# Patient Record
Sex: MALE | Race: WHITE | URBAN - METROPOLITAN AREA
[De-identification: names, ages, dates, MRNs, and addresses within clinical notes are randomized per-mention and may not be internally consistent; named-entity substitution may affect disease eponyms.]

---

## 2017-03-06 ENCOUNTER — OFFICE VISIT (OUTPATIENT)
Dept: FAMILY MEDICINE | Facility: CLINIC | Age: 37
End: 2017-03-06
Payer: COMMERCIAL

## 2017-03-06 VITALS
OXYGEN SATURATION: 98 % | SYSTOLIC BLOOD PRESSURE: 124 MMHG | TEMPERATURE: 97.1 F | HEART RATE: 64 BPM | DIASTOLIC BLOOD PRESSURE: 82 MMHG | RESPIRATION RATE: 15 BRPM | WEIGHT: 167 LBS

## 2017-03-06 DIAGNOSIS — J06.9 VIRAL URI WITH COUGH: ICD-10-CM

## 2017-03-06 DIAGNOSIS — B36.0 TINEA VERSICOLOR: ICD-10-CM

## 2017-03-06 DIAGNOSIS — L01.00 IMPETIGO: Primary | ICD-10-CM

## 2017-03-06 PROBLEM — F17.200 TOBACCO USE DISORDER: Status: ACTIVE | Noted: 2017-03-06

## 2017-03-06 PROCEDURE — 99203 OFFICE O/P NEW LOW 30 MIN: CPT | Performed by: PHYSICIAN ASSISTANT

## 2017-03-06 RX ORDER — FLUCONAZOLE 100 MG/1
100 TABLET ORAL DAILY
Qty: 14 TABLET | Refills: 3 | Status: SHIPPED | OUTPATIENT
Start: 2017-03-06 | End: 2017-04-11

## 2017-03-06 RX ORDER — MUPIROCIN 20 MG/G
OINTMENT TOPICAL 3 TIMES DAILY
Qty: 22 G | Refills: 1 | Status: SHIPPED | OUTPATIENT
Start: 2017-03-06 | End: 2017-03-11

## 2017-03-06 NOTE — PATIENT INSTRUCTIONS
Selsun Blue - apply to affected area and allow to dry on skin for 10-15 minutes before rinsing/showering.  Use daily for two weeks.  Understanding Impetigo (Adult)  Impetigo is a skin infection that causes red, crusty sores. It s more common in young children, but adults can also get it. It can be spread easily from person to person.  What causes impetigo?  Impetigo is caused by bacteria. It can be caused by group A streptococci (strep). Or it can be caused by Staphylococcus aureus (staph). The bacteria can cause the infection after an insect bite, cut, or other skin problem causes a break in your skin.  Symptoms of impetigo  Impetigo causes itchy red sores that ooze fluid (pus) and form honey-colored crusts. They are most common on the face, arms, and legs. The sores can get bigger and spread to other parts of your body.  Diagnosing and treating impetigo  Your health care provider will give you a physical exam. He or she will be able to diagnose impetigo by looking at your sores. A bacterial culture may be performed. Impetigo can be treated with antibiotics. You may be given oral medication. Use the antibiotic medication exactly as directed. Do not stop using it if your symptoms get better. Use all of the medication until it is gone.  Your symptoms will likely get better within 3 days. The sores will take several weeks to heal fully.  If you have impetigo  Impetigo is easily spread from person to person. A person can get sores 1 to 3 days after being exposed to another person s sores. Make sure to protect those around you. To prevent the bacteria from spreading to others:    Cover your sores with a bandage. Wash your hands often. This will also help you avoid spreading the infection to other parts of your body.    Don t share washcloths, towels, pillows, sheets, or clothes with others. Wash these items in hot water before using them again.    Don t scratch or pick at your sores.    Wash your sores with soap and  water. Apply an antibacterial cream as advised.     When to call the health care provider  Make sure to contact your health care provider if you have:    Sores that spread or have more pus after 3 days of treatment    Pain or swelling that s new or worse    Fever of 100.4 F (38 C) or higher    Loss of appetite or vomiting     3561-9247 The [employer] [address] [place] [address]. All rights reserved. This information is not intended as a substitute for professional medical care. Always follow your healthcare professional's instructions.        Tinea Versicolor  This is a rash caused by a fungus in the top layers of the skin. This fungus is normally present in the pores of the skin and causes no symptoms, but when the fungus overgrows it causes a rash. The fungus grows more easily in hot climates and on oily or sweaty skin. It is unknown why some people get this rash and others do not, or why the rash would suddenly appear in someone who has never had it before.  The rash consists of irregular, pale, or tan spots and patches. The rash is usually on the neck, upper back, chest, and shoulders. There may be mild itching, especially if you become overheated, but it doesn't cause other symptoms. Because these spots don't change color with sun exposure like normal skin, the rash may appear as lighter or darker than normal skin.  Since the rash is harmless and usually causes no symptoms, the only reason for treatment is to improve appearance. Follow the advice below to clear the rash. However, it will take several months for normal skin color to return.  Home care  The following guidelines will help you care for yourself at home:    Apply medicated dandruff shampoo (no prescription required) to the rash at night. Then, rinse off in the morning using a bath brush or buffing sponge to scrub the skin and remove top layers of dead skin which contain the fungus. Do this every day for 4 weeks.    As an  alternate treatment, you may buy an antifungal cream (miconazole or clotrimazole, which are available without a prescription) and apply this twice a day for 7 days. You should still use a bath brush or buffing sponge to scrub the skin once a day in the shower.    This rash is not contagious to others. So there is no need to avoid exposure of others at school, , or work.  Prevention  This fungus can recur after treatment. To prevent return of the rash, once a month for the next year, apply medicated dandruff shampoo to the areas where the rash once appeared. Rinse off in the morning and scrub the skin as described above. This is especially important to do in the summer time when the rash is most likely to recur.  Other prevention tips include:    Avoid oily skin products.    Wear loose clothing.    Use sunscreen and protect yourself from sunlight.    Avoid tanning beds.  Follow-up care  Follow up with your doctor or as advised by our staff if the rash fails to improve with the above treatment, or if new symptoms appear.  When to seek medical care  Get prompt medical attention if any of the following occur:    Increasing redness of the rash    Change in appearance of the rash    Fever of 100.4 F (38 C) or higher, or as directed by your health care provider    9876-5176 [employer]. [address] [place] [address], [place] [address]. All rights reserved. This information is not intended as a substitute for professional medical care. Always follow your healthcare professional's instructions.

## 2017-03-06 NOTE — NURSING NOTE
Chief Complaint   Patient presents with     Derm Problem       Initial /82 (BP Location: Left arm, Patient Position: Chair, Cuff Size: Adult Regular)  Pulse 64  Temp 97.1  F (36.2  C) (Tympanic)  Resp 15  Wt 167 lb (75.8 kg)  SpO2 98% There is no height or weight on file to calculate BMI.  Medication Reconciliation: complete

## 2017-03-06 NOTE — MR AVS SNAPSHOT
After Visit Summary   3/6/2017    Vega Valentino    MRN: [id number]           Patient Information     Date Of Birth          1980        Visit Information        Provider Department      3/6/2017 9:50 AM Marisabel Pacheco PA-C [facility]        Today's Diagnoses     Impetigo    -  1    Tinea versicolor          Care Instructions    Selsun Blue - apply to affected area and allow to dry on skin for 10-15 minutes before rinsing/showering.  Use daily for two weeks.  Understanding Impetigo (Adult)  Impetigo is a skin infection that causes red, crusty sores. It s more common in young children, but adults can also get it. It can be spread easily from person to person.  What causes impetigo?  Impetigo is caused by bacteria. It can be caused by group A streptococci (strep). Or it can be caused by Staphylococcus aureus (staph). The bacteria can cause the infection after an insect bite, cut, or other skin problem causes a break in your skin.  Symptoms of impetigo  Impetigo causes itchy red sores that ooze fluid (pus) and form honey-colored crusts. They are most common on the face, arms, and legs. The sores can get bigger and spread to other parts of your body.  Diagnosing and treating impetigo  Your health care provider will give you a physical exam. He or she will be able to diagnose impetigo by looking at your sores. A bacterial culture may be performed. Impetigo can be treated with antibiotics. You may be given oral medication. Use the antibiotic medication exactly as directed. Do not stop using it if your symptoms get better. Use all of the medication until it is gone.  Your symptoms will likely get better within 3 days. The sores will take several weeks to heal fully.  If you have impetigo  Impetigo is easily spread from person to person. A person can get sores 1 to 3 days after being exposed to another person s sores. Make sure to protect those around you. To  prevent the bacteria from spreading to others:    Cover your sores with a bandage. Wash your hands often. This will also help you avoid spreading the infection to other parts of your body.    Don t share washcloths, towels, pillows, sheets, or clothes with others. Wash these items in hot water before using them again.    Don t scratch or pick at your sores.    Wash your sores with soap and water. Apply an antibacterial cream as advised.     When to call the health care provider  Make sure to contact your health care provider if you have:    Sores that spread or have more pus after 3 days of treatment    Pain or swelling that s new or worse    Fever of 100.4 F (38 C) or higher    Loss of appetite or vomiting     0178-0213 [employer]. [address] [place] [address], [place] [address]. All rights reserved. This information is not intended as a substitute for professional medical care. Always follow your healthcare professional's instructions.        Tinea Versicolor  This is a rash caused by a fungus in the top layers of the skin. This fungus is normally present in the pores of the skin and causes no symptoms, but when the fungus overgrows it causes a rash. The fungus grows more easily in hot climates and on oily or sweaty skin. It is unknown why some people get this rash and others do not, or why the rash would suddenly appear in someone who has never had it before.  The rash consists of irregular, pale, or tan spots and patches. The rash is usually on the neck, upper back, chest, and shoulders. There may be mild itching, especially if you become overheated, but it doesn't cause other symptoms. Because these spots don't change color with sun exposure like normal skin, the rash may appear as lighter or darker than normal skin.  Since the rash is harmless and usually causes no symptoms, the only reason for treatment is to improve appearance. Follow the advice below to clear the rash. However, it will take several  months for normal skin color to return.  Home care  The following guidelines will help you care for yourself at home:    Apply medicated dandruff shampoo (no prescription required) to the rash at night. Then, rinse off in the morning using a bath brush or buffing sponge to scrub the skin and remove top layers of dead skin which contain the fungus. Do this every day for 4 weeks.    As an alternate treatment, you may buy an antifungal cream (miconazole or clotrimazole, which are available without a prescription) and apply this twice a day for 7 days. You should still use a bath brush or buffing sponge to scrub the skin once a day in the shower.    This rash is not contagious to others. So there is no need to avoid exposure of others at school, , or work.  Prevention  This fungus can recur after treatment. To prevent return of the rash, once a month for the next year, apply medicated dandruff shampoo to the areas where the rash once appeared. Rinse off in the morning and scrub the skin as described above. This is especially important to do in the summer time when the rash is most likely to recur.  Other prevention tips include:    Avoid oily skin products.    Wear loose clothing.    Use sunscreen and protect yourself from sunlight.    Avoid tanning beds.  Follow-up care  Follow up with your doctor or as advised by our staff if the rash fails to improve with the above treatment, or if new symptoms appear.  When to seek medical care  Get prompt medical attention if any of the following occur:    Increasing redness of the rash    Change in appearance of the rash    Fever of 100.4 F (38 C) or higher, or as directed by your health care provider    [id number] The [employer]. [address] [place] [address], [place] [address]. All rights reserved. This information is not intended as a substitute for professional medical care. Always follow your healthcare professional's instructions.              Follow-ups after your  "visit        Who to contact     If you have questions or need follow up information about today's clinic visit or your schedule please contact [facility] directly at [phone].  Normal or non-critical lab and imaging results will be communicated to you by MyChart, letter or phone within 4 business days after the clinic has received the results. If you do not hear from us within 7 days, please contact the clinic through MyChart or phone. If you have a critical or abnormal lab result, we will notify you by phone as soon as possible.  Submit refill requests through [employer] or call your pharmacy and they will forward the refill request to us. Please allow 3 business days for your refill to be completed.          Additional Information About Your Visit        [email or web address] Information     [employer] lets you send messages to your doctor, view your test results, renew your prescriptions, schedule appointments and more. To sign up, go to [email or web address] . Click on \"Log in\" on the left side of the screen, which will take you to the Welcome page. Then click on \"Sign up Now\" on the right side of the page.     You will be asked to enter the access code listed below, as well as some personal information. Please follow the directions to create your username and password.     Your access code is: [email or web address]  Expires: 2017 10:17 AM     Your access code will  in 90 days. If you need help or a new code, please call your [place] clinic or [phone].        Care EveryWhere ID     This is your Care EveryWhere ID. This could be used by other organizations to access your [place] medical records  [id number]        Your Vitals Were     Pulse Temperature Respirations Pulse Oximetry          64 97.1  F (36.2  C) (Tympanic) 15 98%         Blood Pressure from Last 3 Encounters:   17 124/82   05 100/60    Weight from Last 3 Encounters:   17 167 lb (75.8 kg)              Today, " you had the following     No orders found for display         Today's Medication Changes          These changes are accurate as of: 3/6/17 10:17 AM.  If you have any questions, ask your nurse or doctor.               Start taking these medicines.        Dose/Directions    fluconazole 100 MG tablet   Commonly known as:  DIFLUCAN   Used for:  Tinea versicolor   Started by:  Marisabel Pacheco PA-C        Dose:  100 mg   Take 1 tablet (100 mg) by mouth daily   Quantity:  14 tablet   Refills:  3       mupirocin 2 % ointment   Commonly known as:  BACTROBAN   Used for:  Impetigo   Started by:  Marisabel Pacheco PA-C        Apply topically 3 times daily for 5 days   Quantity:  22 g   Refills:  1         Stop taking these medicines if you haven't already. Please contact your care team if you have questions.     NO ACTIVE MEDICATIONS   Stopped by:  Marisabel Pacheco PA-C                Where to get your medicines      These medications were sent to [facility]/pharmacy #[id number] [address] [address]     Phone:  [phone]     fluconazole 100 MG tablet    mupirocin 2 % ointment                Primary Care Provider    None Specified       No primary provider on file.        Thank you!     Thank you for choosing [facility]  for your care. Our goal is always to provide you with excellent care. Hearing back from our patients is one way we can continue to improve our services. Please take a few minutes to complete the written survey that you may receive in the mail after your visit with us. Thank you!             Your Updated Medication List - Protect others around you: Learn how to safely use, store and throw away your medicines at www.disposemymeds.org.          This list is accurate as of: 3/6/17 10:17 AM.  Always use your most recent med list.                   Brand Name Dispense Instructions for use     fluconazole 100 MG tablet    DIFLUCAN    14 tablet    Take 1 tablet (100 mg) by mouth daily       mupirocin 2 % ointment    BACTROBAN    22 g    Apply topically 3 times daily for 5 days

## 2017-03-06 NOTE — PROGRESS NOTES
SUBJECTIVE:                                                    Vega Valentino is a 36 year old male who presents to clinic today for the following health issues:    Rash     Onset: 4-5 days    Description:   Location: spot on hand, stomach and leg  Character: round, draining, red  Itching (Pruritis): YES    Progression of Symptoms:  worsening    Accompanying Signs & Symptoms:  Fever: no   Body aches or joint pain: no   Sore throat symptoms: YES  Recent cold symptoms: YES- 2-3 weeks ago had stomach flu   History:   Previous similar rash: no     Precipitating factors:   Exposure to similar rash: no   New exposures: None   Recent travel: no     Alleviating factors:  n/a     Therapies Tried and outcome: nothing  Reviewed and updated as needed this visit by clinical staff  Tobacco  Allergies  Meds  Problems  Med Hx  Surg Hx  Fam Hx  Soc Hx        Reviewed and updated as needed this visit by Provider  Tobacco  Allergies  Meds  Problems  Med Hx  Surg Hx  Fam Hx  Soc Hx        Additional complaints:   RESPIRATORY SYMPTOMS      Duration: 5 days    Description  nasal congestion, sore throat, cough and hoarse voice    Severity: mild    Accompanying signs and symptoms: None    History (predisposing factors):  tobacco abuse    Precipitating or alleviating factors: None    Therapies tried and outcome:  none       HPI additional notes: Vega presents today with   Chief Complaint   Patient presents with     Derm Problem   No previous history of similar rash.  Notes itching but no pain.  Only previous rash he has was tinea versicolor which occasionally reoccurs.    No previous history of shingles.     ROS:  C: Negative for fever, chills, recent change in weight  Skin: POSITIVE for rash and pruritis of the left wrist, chest, left leg. Negative for discharge and vesicular lesions  ENT/MOUTH:POSITIVE for congestion and sore throat.  Negative for ear pain and sinus pressure.  Resp: POSITIVE for cough occasionally productive  without  SOB and wheezing  CV: Negative for chest pain or peripheral edema  GI: Negative for nausea, abdominal pain, heartburn, or change in bowel habits  MS: POSITIVE for generalized fatigue and malaise.  P: Negative for changes in mood or affect  ROS otherwise negative.    Chart Review:  History   Smoking Status     Current Every Day Smoker     Packs/day: 0.50     Types: Cigarettes   Smokeless Tobacco     Not on file     Patient Active Problem List   Diagnosis     Tobacco use disorder     Tinea versicolor     History reviewed. No pertinent past surgical history.  Problem list, Medication list, Allergies, Medical/Social/Surg hx reviewed in [facility], updated as appropriate.   OBJECTIVE:                                                    /82 (BP Location: Left arm, Patient Position: Chair, Cuff Size: Adult Regular)  Pulse 64  Temp 97.1  F (36.2  C) (Tympanic)  Resp 15  Wt 167 lb (75.8 kg)  SpO2 98%  There is no height or weight on file to calculate BMI.  GENERAL: healthy, alert, in no acute distress  EYES: Grossly normal to inspection, EOMI, PERRL  HENT: Ear canals normal bilateral. TM pearly gray without effusion bilaterally. Nasal mucosa mildly edematous with clear rhinorrhea.  Mouth- mucous membranes moist, no lesions or ulcerations. Pharynx erythematous without petechia. and No tonsillary hypertrophy. Uvula midline, clear post-nasal drainage.  Maxillary and frontal sinuses nontender to palpation bilaterally.  NECK: Non-tender, no adenopathy.  RESP: lungs clear to auscultation - no rales, no rhonchi, no wheezes  CV: regular rate and rhythm, normal S1 S2. No peripheral edema.  SKIN: erythematous papules with honey colored crusts on ulnar aspect of left wrist, superior chest left of midline and left lateral leg inferior to the knee.  Non-tender to palpation.  No warmth, edema.  PSYCH: Alert and oriented times 3;  Able to articulate logical thoughts. Affect is normal.    Diagnostic test results: none       ASSESSMENT/PLAN:                                                      Tobacco Cessation:   reports that he has been smoking Cigarettes.  He has been smoking about 0.50 packs per day. He does not have any smokeless tobacco history on file.  Tobacco Cessation Action Plan: Information offered: Patient not interested at this time        ICD-10-CM    1. Impetigo L01.00 mupirocin (BACTROBAN) 2 % ointment   2. Tinea versicolor B36.0 fluconazole (DIFLUCAN) 100 MG tablet   3. Viral URI with cough J06.9     B97.89      Discussed diagnosis of impetigo, not sure of cause.  Explained symptoms of shingles.  Avoid scratching.  Discussed viral URI which does not need antibiotics at this time.  Likely still feeling tried and weak due to the combination of his recent gastroenteritis infection and current URI.  Push fluids and try to increase food intake.    Tinea versicolor  - Previously on diflucan for this in the past.  Discussed using dandruff shampoo for two weeks as alternative but also refilled medication.    Please see patient instructions for treatment details.    Follow up in 7-10 days if not improving as anticipated, sooner PRN.    Marisabel Pacheco PA-C  [facility]

## 2017-03-06 NOTE — ASSESSMENT & PLAN NOTE
- Previously on diflucan for this in the past.  Discussed using dandruff shampoo for two weeks as alternative but also refilled medication.

## 2017-03-07 ENCOUNTER — TELEPHONE (OUTPATIENT)
Dept: FAMILY MEDICINE | Facility: CLINIC | Age: 37
End: 2017-03-07

## 2017-03-07 NOTE — TELEPHONE ENCOUNTER
"Patient states that he needs a letter for work stating that \"Patient was seen in our office 3/7/2017 and is not contagious . Patient is ok to return to work without restrictions.\"     States would like everything else to be confidential.    Fax letter to employer at [phone] when completed.  Attention: Wayne Jose      "

## 2017-03-07 NOTE — TELEPHONE ENCOUNTER
Please find out what pt needs, we are swamped and short staffed and I don't know when I'll have time to call him.

## 2017-03-07 NOTE — LETTER
[facility]  [address]  [address]  [facility] [address]  Phone: [phone]  Fax: [phone]    March 7, 2017        Vega Valentino  [address] [facility] [address]      Attn:  Wayne Jose Fax [phone]    To whom it may concern:    RE: Vega Valentino    Patient was seen and treated today at our clinic on March 6th 2017 and missed work.  He is free to return to work without restrictions and is not contagious.        Please contact me for questions or concerns.      Sincerely,        Marisabel Pacheco PA-C

## 2017-03-07 NOTE — TELEPHONE ENCOUNTER
Pt called wanting a cll back from provider to discus OV. Also would like a letter for work.    Renae Tsai, TC

## 2017-03-22 ENCOUNTER — OFFICE VISIT (OUTPATIENT)
Dept: FAMILY MEDICINE | Facility: CLINIC | Age: 37
End: 2017-03-22
Payer: COMMERCIAL

## 2017-03-22 VITALS
WEIGHT: 171 LBS | SYSTOLIC BLOOD PRESSURE: 110 MMHG | RESPIRATION RATE: 14 BRPM | HEART RATE: 82 BPM | DIASTOLIC BLOOD PRESSURE: 70 MMHG | TEMPERATURE: 97.7 F

## 2017-03-22 DIAGNOSIS — B36.0 TINEA VERSICOLOR: ICD-10-CM

## 2017-03-22 DIAGNOSIS — L30.9 DERMATITIS: Primary | ICD-10-CM

## 2017-03-22 DIAGNOSIS — L50.9 WELT: ICD-10-CM

## 2017-03-22 PROCEDURE — 99213 OFFICE O/P EST LOW 20 MIN: CPT | Performed by: FAMILY MEDICINE

## 2017-03-22 RX ORDER — HYDROCORTISONE VALERATE CREAM 2 MG/G
CREAM TOPICAL
Qty: 45 G | Refills: 0 | Status: SHIPPED | OUTPATIENT
Start: 2017-03-22 | End: 2017-04-11

## 2017-03-22 NOTE — PROGRESS NOTES
SUBJECTIVE:                                                    Vega Valentino is a 37 year old male who presents to clinic today for the following health issues:      Rash      Duration: March 1st    Description  Location: arms, legs, forehead, back  Itching: moderate    Intensity:  moderate    Accompanying signs and symptoms: red spots    History (similar episodes/previous evaluation): None    Precipitating or alleviating factors:  New exposures:  None  Recent travel: no      Therapies tried and outcome: Patient had been putting Bactroban on the lesions by his left wrist and a couple on his leg.  Those areas have improved and dried up.  However, he is now developing lesions that appear like welts over his scalp and legs.             Problem list and histories reviewed & adjusted, as indicated.  Additional history: as documented    Patient Active Problem List   Diagnosis     Tobacco use disorder     Tinea versicolor     Welt     No past surgical history on file.    Social History   Substance Use Topics     Smoking status: Current Every Day Smoker     Packs/day: 0.50     Types: Cigarettes     Smokeless tobacco: Not on file     Alcohol use Yes      Comment: minimal, once a weekend     No family history on file.        Reviewed and updated as needed this visit by clinical staff  Tobacco  Allergies  Meds       Reviewed and updated as needed this visit by Provider         ROS:  Constitutional, HEENT, cardiovascular, pulmonary, gi and gu systems are negative, except as otherwise noted.    OBJECTIVE:                                                    /70  Pulse 82  Temp 97.7  F (36.5  C) (Tympanic)  Resp 14  Wt 171 lb (77.6 kg)  There is no height or weight on file to calculate BMI.  GENERAL APPEARANCE: healthy, alert and no distress  SKIN: There are red raised welts present over the left anterior forehead.  He also has them on his thighs.  They vary in size from 1 cm up to 2-1/2 cm.  They have an erythematous  color to them.         ASSESSMENT/PLAN:                                                        ICD-10-CM    1. Dermatitis L30.9 hydrocortisone (WESTCORT) 0.2 % cream   2. Tinea versicolor B36.0    3. Welt L50.9        Patient Instructions     Tinea Versicolor  This is a rash caused by a fungus in the top layers of the skin. This fungus is normally present in the pores of the skin and causes no symptoms, but when the fungus overgrows it causes a rash. The fungus grows more easily in hot climates and on oily or sweaty skin. It is unknown why some people get this rash and others do not, or why the rash would suddenly appear in someone who has never had it before.  The rash consists of irregular, pale, or tan spots and patches. The rash is usually on the neck, upper back, chest, and shoulders. There may be mild itching, especially if you become overheated, but it doesn't cause other symptoms. Because these spots don't change color with sun exposure like normal skin, the rash may appear as lighter or darker than normal skin.  Since the rash is harmless and usually causes no symptoms, the only reason for treatment is to improve appearance. Follow the advice below to clear the rash. However, it will take several months for normal skin color to return.  Home care  The following guidelines will help you care for yourself at home:    Apply medicated dandruff shampoo (no prescription required) to the rash at night. Then, rinse off in the morning using a bath brush or buffing sponge to scrub the skin and remove top layers of dead skin which contain the fungus. Do this every day for 4 weeks.    As an alternate treatment, you may buy an antifungal cream (miconazole or clotrimazole, which are available without a prescription) and apply this twice a day for 7 days. You should still use a bath brush or buffing sponge to scrub the skin once a day in the shower.    This rash is not contagious to others. So there is no need to avoid  exposure of others at school, , or work.  Prevention  This fungus can recur after treatment. To prevent return of the rash, once a month for the next year, apply medicated dandruff shampoo to the areas where the rash once appeared. Rinse off in the morning and scrub the skin as described above. This is especially important to do in the summer time when the rash is most likely to recur.  Other prevention tips include:    Avoid oily skin products.    Wear loose clothing.    Use sunscreen and protect yourself from sunlight.    Avoid tanning beds.  Follow-up care  Follow up with your doctor or as advised by our staff if the rash fails to improve with the above treatment, or if new symptoms appear.  When to seek medical care  Get prompt medical attention if any of the following occur:    Increasing redness of the rash    Change in appearance of the rash    Fever of 100.4 F (38 C) or higher, or as directed by your health care provider    9910-9365 [employer]. [address] [place] [address], [place] [address]. All rights reserved. This information is not intended as a substitute for professional medical care. Always follow your healthcare professional's instructions.      I will place the patient on Westcort cream for his welts.  If they are not improving with that treatment he will let me know so I can refer him to dermatology.  I gave him the information noted above on tinea versicolor.  We had discussion about other treatments that are not listed on the sheet.  He will think about that and let us know.      Daniel Matthews MD  [facility]

## 2017-03-22 NOTE — MR AVS SNAPSHOT
After Visit Summary   3/22/2017    Vega Valentino    MRN: [id number]           Patient Information     Date Of Birth          1980        Visit Information        Provider Department      3/22/2017 3:00 PM Daniel Matthews MD [facility]        Today's Diagnoses     Dermatitis    -  1    Tinea versicolor          Care Instructions      Tinea Versicolor  This is a rash caused by a fungus in the top layers of the skin. This fungus is normally present in the pores of the skin and causes no symptoms, but when the fungus overgrows it causes a rash. The fungus grows more easily in hot climates and on oily or sweaty skin. It is unknown why some people get this rash and others do not, or why the rash would suddenly appear in someone who has never had it before.  The rash consists of irregular, pale, or tan spots and patches. The rash is usually on the neck, upper back, chest, and shoulders. There may be mild itching, especially if you become overheated, but it doesn't cause other symptoms. Because these spots don't change color with sun exposure like normal skin, the rash may appear as lighter or darker than normal skin.  Since the rash is harmless and usually causes no symptoms, the only reason for treatment is to improve appearance. Follow the advice below to clear the rash. However, it will take several months for normal skin color to return.  Home care  The following guidelines will help you care for yourself at home:    Apply medicated dandruff shampoo (no prescription required) to the rash at night. Then, rinse off in the morning using a bath brush or buffing sponge to scrub the skin and remove top layers of dead skin which contain the fungus. Do this every day for 4 weeks.    As an alternate treatment, you may buy an antifungal cream (miconazole or clotrimazole, which are available without a prescription) and apply this twice a day for 7 days. You should still use  a bath brush or buffing sponge to scrub the skin once a day in the shower.    This rash is not contagious to others. So there is no need to avoid exposure of others at school, , or work.  Prevention  This fungus can recur after treatment. To prevent return of the rash, once a month for the next year, apply medicated dandruff shampoo to the areas where the rash once appeared. Rinse off in the morning and scrub the skin as described above. This is especially important to do in the summer time when the rash is most likely to recur.  Other prevention tips include:    Avoid oily skin products.    Wear loose clothing.    Use sunscreen and protect yourself from sunlight.    Avoid tanning beds.  Follow-up care  Follow up with your doctor or as advised by our staff if the rash fails to improve with the above treatment, or if new symptoms appear.  When to seek medical care  Get prompt medical attention if any of the following occur:    Increasing redness of the rash    Change in appearance of the rash    Fever of 100.4 F (38 C) or higher, or as directed by your health care provider    5839-4461 [employer]. [address] [place] [address]. All rights reserved. This information is not intended as a substitute for professional medical care. Always follow your healthcare professional's instructions.              Follow-ups after your visit        Who to contact     If you have questions or need follow up information about today's clinic visit or your schedule please contact [facility] directly at [phone].  Normal or non-critical lab and imaging results will be communicated to you by MyChart, letter or phone within 4 business days after the clinic has received the results. If you do not hear from us within 7 days, please contact the clinic through MyChart or phone. If you have a critical or abnormal lab result, we will notify you by phone as soon as  "possible.  Submit refill requests through [employer] or call your pharmacy and they will forward the refill request to us. Please allow 3 business days for your refill to be completed.          Additional Information About Your Visit        [employer] Information     [employer] lets you send messages to your doctor, view your test results, renew your prescriptions, schedule appointments and more. To sign up, go to [email or web address] . Click on \"Log in\" on the left side of the screen, which will take you to the Welcome page. Then click on \"Sign up Now\" on the right side of the page.     You will be asked to enter the access code listed below, as well as some personal information. Please follow the directions to create your username and password.     Your access code is: [email or web address]  Expires: 2017 11:17 AM     Your access code will  in 90 days. If you need help or a new code, please call your [place] clinic or [phone].        Care EveryWhere ID     This is your Care EveryWhere ID. This could be used by other organizations to access your [facility] medical records  [id number]        Your Vitals Were     Pulse Temperature Respirations             82 97.7  F (36.5  C) (Tympanic) 14          Blood Pressure from Last 3 Encounters:   17 110/70   17 124/82   05 100/60    Weight from Last 3 Encounters:   17 171 lb (77.6 kg)   17 167 lb (75.8 kg)              Today, you had the following     No orders found for display         Today's Medication Changes          These changes are accurate as of: 3/22/17  3:09 PM.  If you have any questions, ask your nurse or doctor.               Start taking these medicines.        Dose/Directions    hydrocortisone 0.2 % cream   Commonly known as:  WESTCORT   Used for:  Dermatitis   Started by:  Daniel Matthews MD        Apply sparingly to affected area three times daily for 14 days.   Quantity:  45 g   Refills:  0            Where to get " your medicines      These medications were sent to [facility]/pharmacy #[id number] - [place], [place] - [address]  [address] [facility] [address] [facility] [address]     Phone:  [phone]     hydrocortisone 0.2 % cream                Primary Care Provider    None Specified       No primary provider on file.        Thank you!     Thank you for choosing [facility]  for your care. Our goal is always to provide you with excellent care. Hearing back from our patients is one way we can continue to improve our services. Please take a few minutes to complete the written survey that you may receive in the mail after your visit with us. Thank you!             Your Updated Medication List - Protect others around you: Learn how to safely use, store and throw away your medicines at www.disposemymeds.org.          This list is accurate as of: 3/22/17  3:09 PM.  Always use your most recent med list.                   Brand Name Dispense Instructions for use    fluconazole 100 MG tablet    DIFLUCAN    14 tablet    Take 1 tablet (100 mg) by mouth daily       hydrocortisone 0.2 % cream    WESTCORT    45 g    Apply sparingly to affected area three times daily for 14 days.

## 2017-03-22 NOTE — PATIENT INSTRUCTIONS
Tinea Versicolor  This is a rash caused by a fungus in the top layers of the skin. This fungus is normally present in the pores of the skin and causes no symptoms, but when the fungus overgrows it causes a rash. The fungus grows more easily in hot climates and on oily or sweaty skin. It is unknown why some people get this rash and others do not, or why the rash would suddenly appear in someone who has never had it before.  The rash consists of irregular, pale, or tan spots and patches. The rash is usually on the neck, upper back, chest, and shoulders. There may be mild itching, especially if you become overheated, but it doesn't cause other symptoms. Because these spots don't change color with sun exposure like normal skin, the rash may appear as lighter or darker than normal skin.  Since the rash is harmless and usually causes no symptoms, the only reason for treatment is to improve appearance. Follow the advice below to clear the rash. However, it will take several months for normal skin color to return.  Home care  The following guidelines will help you care for yourself at home:    Apply medicated dandruff shampoo (no prescription required) to the rash at night. Then, rinse off in the morning using a bath brush or buffing sponge to scrub the skin and remove top layers of dead skin which contain the fungus. Do this every day for 4 weeks.    As an alternate treatment, you may buy an antifungal cream (miconazole or clotrimazole, which are available without a prescription) and apply this twice a day for 7 days. You should still use a bath brush or buffing sponge to scrub the skin once a day in the shower.    This rash is not contagious to others. So there is no need to avoid exposure of others at school, , or work.  Prevention  This fungus can recur after treatment. To prevent return of the rash, once a month for the next year, apply medicated dandruff shampoo to the areas where the rash once appeared.  Rinse off in the morning and scrub the skin as described above. This is especially important to do in the summer time when the rash is most likely to recur.  Other prevention tips include:    Avoid oily skin products.    Wear loose clothing.    Use sunscreen and protect yourself from sunlight.    Avoid tanning beds.  Follow-up care  Follow up with your doctor or as advised by our staff if the rash fails to improve with the above treatment, or if new symptoms appear.  When to seek medical care  Get prompt medical attention if any of the following occur:    Increasing redness of the rash    Change in appearance of the rash    Fever of 100.4 F (38 C) or higher, or as directed by your health care provider    7737-8826 [employer]. [address] [place] [address], [place] [address]. All rights reserved. This information is not intended as a substitute for professional medical care. Always follow your healthcare professional's instructions.      I will place the patient on Westcort cream for his welts.  If they are not improving with that treatment he will let me know so I can refer him to dermatology.  I gave him the information noted above on tinea versicolor.  We had discussion about other treatments that are not listed on the sheet.  He will think about that and let us know.

## 2017-03-23 ENCOUNTER — MYC MEDICAL ADVICE (OUTPATIENT)
Dept: FAMILY MEDICINE | Facility: CLINIC | Age: 37
End: 2017-03-23

## 2017-03-23 NOTE — TELEPHONE ENCOUNTER
Sent message via my chart to patient that he should give medication more than a day to see if it works and that at this late hour in the day we would not be able to schedule an appointment and we do not have a referral from  who will not be in the office until Monday.

## 2017-03-28 ENCOUNTER — OFFICE VISIT (OUTPATIENT)
Dept: FAMILY MEDICINE | Facility: CLINIC | Age: 37
End: 2017-03-28
Payer: COMMERCIAL

## 2017-03-28 DIAGNOSIS — B09 VIRAL EXANTHEM: Primary | ICD-10-CM

## 2017-03-28 PROCEDURE — 99214 OFFICE O/P EST MOD 30 MIN: CPT | Performed by: FAMILY MEDICINE

## 2017-03-28 RX ORDER — PREDNISONE 20 MG/1
TABLET ORAL
Qty: 14 TABLET | Refills: 0 | Status: SHIPPED | OUTPATIENT
Start: 2017-03-28 | End: 2017-04-11

## 2017-03-28 NOTE — PROGRESS NOTES
[facility] - PRIMARY CARE SKIN    CC : Rash   SUBJECTIVE:                                                    Vega Valentino is a 37 year old male who presents to clinic today because of a(n) rash beginning about 2 weeks ago in various spots on the left knee, left wrist and right chest. Rash appeared as mildly itchy, hard, watery bubbles at areas of involvement; he denies any tenderness. These quickly cleared within 1 day of starting mupirocin. However, new lesions have appeared in a generalized distribution in the last 1 week.     Pruritic : mildly itching.  Symptoms appear to be : waxing and waning in intensity.  Aggravating factors : none identified.  Relieving factors : none identified.    Previous similar history : NO.  Recent exposure history : He recalls a flu 3 weeks ago that preceded rash symptoms by 1 week. Sore throat and fatigue have resolved.  Any other family members with similar symptoms : NO. Two roommates have not had rash symptoms. No pets at home. Lives in an older home, but he denies any similarity to previous bed bug issue five years ago.    Therapies tried : Rx mupirocin began to alleviate itchiness and raised nature of rash areas within 1 day usage. Rx hydrocortisone also prescribed in the last 1 week which has not alleviated itchiness. Rx fluconazole prescribed in the last 3 weeks as well.    Personal Medical History  Skin Cancer : NO  Eczema Psoriasis Rosacea Autoimmune Other   NO NO NO NO NO     Family Medical History  Skin Cancer : NO  Eczema Psoriasis Rosacea Autoimmune Other   NO NO NO NO NO     Occupation : installs electrical equipment (indoor).    Patient Active Problem List   Diagnosis     Tobacco use disorder     Tinea versicolor     Welt       History reviewed. No pertinent past medical history. History reviewed. No pertinent surgical history.   Social History   Substance Use Topics     Smoking status: Current Every Day Smoker     Packs/day: 0.50     Types: Cigarettes      Smokeless tobacco: Not on file     Alcohol use Yes      Comment: minimal, once a weekend         Prescription Medications as of 3/28/2017             predniSONE (DELTASONE) 20 MG tablet 2 tabs po q day times 7 days    hydrocortisone (WESTCORT) 0.2 % cream Apply sparingly to affected area three times daily for 14 days.    fluconazole (DIFLUCAN) 100 MG tablet Take 1 tablet (100 mg) by mouth daily            Allergies   Allergen Reactions     Iodine      Seafood      Shrimp         INTEGUMENTARY/SKIN: POSITIVE for pruritis and rash    ROS : 14 point review of systems was negative except the symptoms listed above in the HPI.    This document serves as a record of the services and decisions personally performed and made by Ximena Harvey MD. It was created on her behalf by Master Spivey, a trained medical scribe.  The creation of this document is based on the scribe's personal observations and the provider's statements to the medical scribe.  Master Spivey, March 28, 2017 2:01 PM      OBJECTIVE:                                                    GENERAL: healthy, alert and in mild distress  SKIN: [place] Skin Type - I.  Scalp, Face, Neck, Trunk, Arms, Legs, Hands and Fingers were examined. The dermatoscope was used to help evaluate pigmented lesions.  Skin Pertinent Findings:  Left lateral forehead : Three resolving discrete erythematous papules.    Left upper arm : Erythematous papule with superficial excoriation    Chest : clear  Back : single resolving erythematous excoriated papule on the left side    Left anterior medial thigh : Three, discrete, excoriated, indurated papules, 5 mm - 6 mm in size.    Right forehead, Right arm : Clear.    Diagnostic Test Results:  none     MDM : based on the clinical appearance and history I suspect this is a viral exanthem, if this would continue then I would consider biopsy.      ASSESSMENT:                                                      Encounter Diagnosis   Name Primary?      Viral exanthem Yes         PLAN:                                                    Patient Instructions   FUTURE APPOINTMENTS  Follow up as needed if rash recurring. Make sure to take digital photos of flare-ups and bring into office visits. If rash recurs, call [phone] to re-schedule.    PREDNISONE INSTRUCTIONS (oral steroid)  Take by mouth 2 tablets of prednisone 20 mg at the same time every day and with food to prevent stomach upset.    DRY SKIN INSTRUCTIONS  Routine use of moisturizer is important for healthy, resilient skin.    Twice daily use of a moisturizer such as over-the-counter (OTC) CeraVe moisturizer cream (in the jar) or OTC Cetaphil RestoraDerm moisturizer. These contain ceramides and filaggrin proteins that can help to maintain the body's moisture layer.    Always apply moisturizer after washing, within 3 minutes of drying off for best effect.    Do not overuse soap. Just apply soap on the groin and armpits, unless you have been sweating extensively. Recommended products include OTC unscented Dove sensitive skin or OTC Vanicream cleansing bar.    Good facial cleansers include OTC CeraVe hydrating facial cleanser or OTC Cetaphil daily facial cleanser.    Avoid use of scented products and/or antistatic dryer sheets.      The patient was counseled to use products free of fragrance, dyes, and plants. The importance of using bland cleansers and the regular use of heavy bland emollient creams was impressed upon the patient.      PROCEDURES:                                                    None.    TT : 25 minutes.  CT : 15 minutes.      The information in this document, created by the medical scribe for me, accurately reflects the services I personally performed and the decisions made by me. I have reviewed and approved this document for accuracy prior to leaving the patient care area.  Ximena Harvey MD March 28, 2017 2:01 PM  [facility] - PRIMARY CARE SKIN

## 2017-03-28 NOTE — PATIENT INSTRUCTIONS
FUTURE APPOINTMENTS  Follow up as needed if rash recurring. Make sure to take digital photos of flare-ups and bring into office visits. If rash recurs, call [phone] to re-schedule.    PREDNISONE INSTRUCTIONS (oral steroid)  Take by mouth 2 tablets of prednisone 20 mg at the same time every day and with food to prevent stomach upset.    DRY SKIN INSTRUCTIONS  Routine use of moisturizer is important for healthy, resilient skin.    Twice daily use of a moisturizer such as over-the-counter (OTC) CeraVe moisturizer cream (in the jar) or OTC Cetaphil RestoraDerm moisturizer. These contain ceramides and filaggrin proteins that can help to maintain the body's moisture layer.    Always apply moisturizer after washing, within 3 minutes of drying off for best effect.    Do not overuse soap. Just apply soap on the groin and armpits, unless you have been sweating extensively. Recommended products include OTC unscented Dove sensitive skin or OTC Vanicream cleansing bar.    Good facial cleansers include OTC CeraVe hydrating facial cleanser or OTC Cetaphil daily facial cleanser.    Avoid use of scented products and/or antistatic dryer sheets.

## 2017-03-28 NOTE — MR AVS SNAPSHOT
After Visit Summary   3/28/2017    Vega Valentino    MRN: [id number]           Patient Information     Date Of Birth          1980        Visit Information        Provider Department      3/28/2017 2:00 PM Jennifer Harvey MD [facility]        Today's Diagnoses     Viral exanthem    -  1      Care Instructions    FUTURE APPOINTMENTS  Follow up as needed if rash recurring. Make sure to take digital photos of flare-ups and bring into office visits. If rash recurs, call [phone] to re-schedule.    PREDNISONE INSTRUCTIONS (oral steroid)  Take by mouth 2 tablets of prednisone 20 mg at the same time every day and with food to prevent stomach upset.    DRY SKIN INSTRUCTIONS  Routine use of moisturizer is important for healthy, resilient skin.    Twice daily use of a moisturizer such as over-the-counter (OTC) CeraVe moisturizer cream (in the jar) or OTC Cetaphil RestoraDerm moisturizer. These contain ceramides and filaggrin proteins that can help to maintain the body's moisture layer.    Always apply moisturizer after washing, within 3 minutes of drying off for best effect.    Do not overuse soap. Just apply soap on the groin and armpits, unless you have been sweating extensively. Recommended products include OTC unscented Dove sensitive skin or OTC Vanicream cleansing bar.    Good facial cleansers include OTC CeraVe hydrating facial cleanser or OTC Cetaphil daily facial cleanser.    Avoid use of scented products and/or antistatic dryer sheets.        Follow-ups after your visit        Who to contact     If you have questions or need follow up information about today's clinic visit or your schedule please contact [facility] directly at [phone].  Normal or non-critical lab and imaging results will be communicated to you by MyChart, letter or phone within 4 business days after the clinic has received the results. If you do not hear from us  within 7 days, please contact the clinic through "[employer] or phone. If you have a critical or abnormal lab result, we will notify you by phone as soon as possible.  Submit refill requests through "[employer] or call your pharmacy and they will forward the refill request to us. Please allow 3 business days for your refill to be completed.          Additional Information About Your Visit        [employer] Information     "[employer] gives you secure access to your electronic health record. If you see a primary care provider, you can also send messages to your care team and make appointments. If you have questions, please call your primary care clinic.  If you do not have a primary care provider, please call [phone] and they will assist you.        Care EveryWhere ID     This is your Care EveryWhere ID. This could be used by other organizations to access your [place] medical records  [id number]         Blood Pressure from Last 3 Encounters:   03/22/17 110/70   03/06/17 124/82   08/29/05 100/60    Weight from Last 3 Encounters:   03/22/17 171 lb (77.6 kg)   03/06/17 167 lb (75.8 kg)              Today, you had the following     No orders found for display         Today's Medication Changes          These changes are accurate as of: 3/28/17  2:18 PM.  If you have any questions, ask your nurse or doctor.               Start taking these medicines.        Dose/Directions    predniSONE 20 MG tablet   Commonly known as:  DELTASONE   Used for:  Viral exanthem   Started by:  Jennifer Harvey MD        2 tabs po q day times 7 days   Quantity:  14 tablet   Refills:  0            Where to get your medicines      These medications were sent to [facility]/pharmacy #[id number] - [place], [place] - [address] [place] [id number]     Phone:  [phone]     predniSONE 20 MG tablet                Primary Care Provider    None Specified       No primary provider on file.        Thank you!     Thank you for  choosing [facility]  for your care. Our goal is always to provide you with excellent care. Hearing back from our patients is one way we can continue to improve our services. Please take a few minutes to complete the written survey that you may receive in the mail after your visit with us. Thank you!             Your Updated Medication List - Protect others around you: Learn how to safely use, store and throw away your medicines at www.disposemymeds.org.          This list is accurate as of: 3/28/17  2:18 PM.  Always use your most recent med list.                   Brand Name Dispense Instructions for use    fluconazole 100 MG tablet    DIFLUCAN    14 tablet    Take 1 tablet (100 mg) by mouth daily       hydrocortisone 0.2 % cream    WESTCORT    45 g    Apply sparingly to affected area three times daily for 14 days.       predniSONE 20 MG tablet    DELTASONE    14 tablet    2 tabs po q day times 7 days

## 2017-04-11 ENCOUNTER — OFFICE VISIT (OUTPATIENT)
Dept: FAMILY MEDICINE | Facility: CLINIC | Age: 37
End: 2017-04-11
Payer: COMMERCIAL

## 2017-04-11 DIAGNOSIS — L30.9 DERMATITIS: Primary | ICD-10-CM

## 2017-04-11 LAB
ALBUMIN SERPL-MCNC: 4.3 G/DL (ref 3.4–5)
ALP SERPL-CCNC: 70 U/L (ref 40–150)
ALT SERPL W P-5'-P-CCNC: 24 U/L (ref 0–70)
ANION GAP SERPL CALCULATED.3IONS-SCNC: 5 MMOL/L (ref 3–14)
AST SERPL W P-5'-P-CCNC: 7 U/L (ref 0–45)
BASOPHILS # BLD AUTO: 0 10E9/L (ref 0–0.2)
BASOPHILS NFR BLD AUTO: 0.2 %
BILIRUB SERPL-MCNC: 0.4 MG/DL (ref 0.2–1.3)
BUN SERPL-MCNC: 11 MG/DL (ref 7–30)
CALCIUM SERPL-MCNC: 9.1 MG/DL (ref 8.5–10.1)
CHLORIDE SERPL-SCNC: 106 MMOL/L (ref 94–109)
CO2 SERPL-SCNC: 29 MMOL/L (ref 20–32)
CREAT SERPL-MCNC: 0.88 MG/DL (ref 0.66–1.25)
CRP SERPL-MCNC: <2.9 MG/L (ref 0–8)
DIFFERENTIAL METHOD BLD: NORMAL
EOSINOPHIL # BLD AUTO: 0.6 10E9/L (ref 0–0.7)
EOSINOPHIL NFR BLD AUTO: 8.4 %
ERYTHROCYTE [DISTWIDTH] IN BLOOD BY AUTOMATED COUNT: 12.4 % (ref 10–15)
ERYTHROCYTE [SEDIMENTATION RATE] IN BLOOD BY WESTERGREN METHOD: 4 MM/H (ref 0–15)
GFR SERPL CREATININE-BSD FRML MDRD: NORMAL ML/MIN/1.7M2
GLUCOSE SERPL-MCNC: 99 MG/DL (ref 70–99)
HCT VFR BLD AUTO: 48.4 % (ref 40–53)
HGB BLD-MCNC: 16.5 G/DL (ref 13.3–17.7)
LYMPHOCYTES # BLD AUTO: 2 10E9/L (ref 0.8–5.3)
LYMPHOCYTES NFR BLD AUTO: 29.9 %
MCH RBC QN AUTO: 30.1 PG (ref 26.5–33)
MCHC RBC AUTO-ENTMCNC: 34.1 G/DL (ref 31.5–36.5)
MCV RBC AUTO: 88 FL (ref 78–100)
MISCELLANEOUS TEST: NORMAL
MONOCYTES # BLD AUTO: 0.5 10E9/L (ref 0–1.3)
MONOCYTES NFR BLD AUTO: 7.1 %
NEUTROPHILS # BLD AUTO: 3.6 10E9/L (ref 1.6–8.3)
NEUTROPHILS NFR BLD AUTO: 54.4 %
PLATELET # BLD AUTO: 208 10E9/L (ref 150–450)
POTASSIUM SERPL-SCNC: 4.3 MMOL/L (ref 3.4–5.3)
PROT SERPL-MCNC: 7.6 G/DL (ref 6.8–8.8)
RBC # BLD AUTO: 5.48 10E12/L (ref 4.4–5.9)
SODIUM SERPL-SCNC: 140 MMOL/L (ref 133–144)
WBC # BLD AUTO: 6.7 10E9/L (ref 4–11)

## 2017-04-11 PROCEDURE — 99000 SPECIMEN HANDLING OFFICE-LAB: CPT | Performed by: FAMILY MEDICINE

## 2017-04-11 PROCEDURE — 88305 TISSUE EXAM BY PATHOLOGIST: CPT | Performed by: FAMILY MEDICINE

## 2017-04-11 PROCEDURE — 11101 HC BIOPSY SKIN/SUBQ/MUC MEM, SINGLE LESION: CPT | Performed by: FAMILY MEDICINE

## 2017-04-11 PROCEDURE — 86038 ANTINUCLEAR ANTIBODIES: CPT | Performed by: FAMILY MEDICINE

## 2017-04-11 PROCEDURE — 99214 OFFICE O/P EST MOD 30 MIN: CPT | Mod: 25 | Performed by: FAMILY MEDICINE

## 2017-04-11 PROCEDURE — 11100 HC BIOPSY SKIN/SUBQ/MUC MEM, EACH ADDTL LESION: CPT | Performed by: FAMILY MEDICINE

## 2017-04-11 PROCEDURE — 86140 C-REACTIVE PROTEIN: CPT | Performed by: FAMILY MEDICINE

## 2017-04-11 PROCEDURE — 85652 RBC SED RATE AUTOMATED: CPT | Performed by: FAMILY MEDICINE

## 2017-04-11 PROCEDURE — 85025 COMPLETE CBC W/AUTO DIFF WBC: CPT | Performed by: FAMILY MEDICINE

## 2017-04-11 PROCEDURE — 36415 COLL VENOUS BLD VENIPUNCTURE: CPT | Performed by: FAMILY MEDICINE

## 2017-04-11 PROCEDURE — 80053 COMPREHEN METABOLIC PANEL: CPT | Performed by: FAMILY MEDICINE

## 2017-04-11 RX ORDER — PREDNISONE 20 MG/1
TABLET ORAL
Qty: 15 TABLET | Refills: 0 | Status: SHIPPED | OUTPATIENT
Start: 2017-04-11

## 2017-04-11 NOTE — PATIENT INSTRUCTIONS
"FUTURE APPOINTMENTS  Follow up in 7-10 day(s) for Suture Removal and Tissue Pathology Review, with Dr. Harvey.    PREDNISONE 40 mg tablet TAPER INSTRUCTIONS (oral steroid):  Take by mouth at the same time every day and with food to prevent stomach upset. If it helps to remember, record these instructions in a frequently visible place.    Scheduled Day # Dosage   1-3 40 mg = 2 tablets   4-6 30 mg = 1.5 tablets   7-9 20 mg = 1 tablet   10-12 10 mg = 0.5 tablets       WOUND CARE INSTRUCTIONS  1. After 24 hours, change dressing daily.  2. Wash hands before every dressing change.  3. Wash the wound area with a mild soap, then rinse  4. Gently pat dry with a sterile gauze or Q-tip.  5. Apply Vaseline or Aquaphor only over entire wound. Do NOT use Neosporin - as many people react to neomycin.  6. Finally, cover with a bandage or sterile non-stick gauze with micropore paper tape.  7. Repeat once daily until wound has healed.    Do not let the wound dry out.  The wound will heal faster and with better cosmetic results if routinely kept moist with step #5. It is a false belief that a wound heals better when it is exposed to air and allowed to dry out.      Soap, water and shampoo will not hurt this area.    Do not go swimming or take baths, but showering is encouraged.    Limit use of the area where the procedure was done for a few days to allow for optimal healing.    If you experience bleeding:  Repeat steps #2-5 and hold firm pressure on the area for 10 minutes without checking to see if the bleeding has stopped. \"Checking\" pulls off the protective wound clot and restarts the bleeding all over again. Re-apply pressure for 10 minutes if necessary to stop bleeding.  Use additional sterile gauze and tape to maintain pressure once bleeding has stopped.    Signs of Infection:  Infection can occur in any area where skin has been disrupted.  If you notice persistent redness, swelling, colored drainage, increasing pain, fever or " other signs of infection, please call us at: [phone] and ask to have me or my colleague paged. We will call you back to discuss.    Pathology Results:  You will be notified, generally via letter or MyChart, in approximately 10 days. If there is anything we need to discuss or further treatment needed, I will call you to discuss it.    Skin tissue can be some of the most challenging tissue for pathologists to examine, therefore we may use a specialist outside the [employer] system to read certain submitted tissue samples. When submitted to these outside specialists, [employer] will notify you that your tissue sample result has returned. However, this only means that the tissue sample has been sent to the specialist. These results are not available in [employer], so I will contact you when I receive the final report.    PATIENT INFORMATION : WOUNDS  During the healing process you will notice a number of changes. All wounds develop a small halo of redness surrounding the wound.  This means healing is occurring. Severe itching with extensive redness usually indicates sensitivity to the ointment or bandage tape used to dress the wound.  You should call our office if this develops.      Swelling  and/or discoloration around your surgical site is common, particularly when performed around the eye.    All wounds normally drain.  The larger the wound the more drainage there will be.  After 7-10 days, you will notice the wound beginning to shrink and new skin will begin to grow.  The wound is healed when you can see skin has formed over the entire area.  A healed wound has a healthy, shiny look to the surface and is red to dark pink in color to normalize.  Wounds may take approximately 4-6 weeks to heal.  Larger wounds may take 6-8 weeks. After the wound is healed you may discontinue dressing changes.    You may experience a sensation of tightness as your wound heals. This is normal and will gradually subside.    Your healed  wound may be sensitive to temperature changes. This sensitivity improves with time, but if you re having a lot of discomfort, try to avoid temperature extremes.    Patients frequently experience itching after their wound appears to have healed because of the continue healing under the skin.  Plain Vaseline will help relieve the itching.

## 2017-04-11 NOTE — MR AVS SNAPSHOT
"              After Visit Summary   4/11/2017    Vega Valentino    MRN: [id number]           Patient Information     Date Of Birth          1980        Visit Information        Provider Department      4/11/2017 9:40 AM Jennifer Harvey MD [facility] - [facility]        Today's Diagnoses     Dermatitis    -  1      Care Instructions    FUTURE APPOINTMENTS  Follow up in 7-10 day(s) for Suture Removal and Tissue Pathology Review, with Dr. Harvey.    PREDNISONE 40 mg tablet TAPER INSTRUCTIONS (oral steroid):  Take by mouth at the same time every day and with food to prevent stomach upset. If it helps to remember, record these instructions in a frequently visible place.    Scheduled Day # Dosage   1-3 40 mg = 2 tablets   4-6 30 mg = 1.5 tablets   7-9 20 mg = 1 tablet   10-12 10 mg = 0.5 tablets       WOUND CARE INSTRUCTIONS  1. After 24 hours, change dressing daily.  2. Wash hands before every dressing change.  3. Wash the wound area with a mild soap, then rinse  4. Gently pat dry with a sterile gauze or Q-tip.  5. Apply Vaseline or Aquaphor only over entire wound. Do NOT use Neosporin - as many people react to neomycin.  6. Finally, cover with a bandage or sterile non-stick gauze with micropore paper tape.  7. Repeat once daily until wound has healed.    Do not let the wound dry out.  The wound will heal faster and with better cosmetic results if routinely kept moist with step #5. It is a false belief that a wound heals better when it is exposed to air and allowed to dry out.      Soap, water and shampoo will not hurt this area.    Do not go swimming or take baths, but showering is encouraged.    Limit use of the area where the procedure was done for a few days to allow for optimal healing.    If you experience bleeding:  Repeat steps #2-5 and hold firm pressure on the area for 10 minutes without checking to see if the bleeding has stopped. \"Checking\" pulls off the protective wound clot and " restarts the bleeding all over again. Re-apply pressure for 10 minutes if necessary to stop bleeding.  Use additional sterile gauze and tape to maintain pressure once bleeding has stopped.    Signs of Infection:  Infection can occur in any area where skin has been disrupted.  If you notice persistent redness, swelling, colored drainage, increasing pain, fever or other signs of infection, please call us at: [phone] and ask to have me or my colleague paged. We will call you back to discuss.    Pathology Results:  You will be notified, generally via letter or MyChart, in approximately 10 days. If there is anything we need to discuss or further treatment needed, I will call you to discuss it.    Skin tissue can be some of the most challenging tissue for pathologists to examine, therefore we may use a specialist outside the [employer] system to read certain submitted tissue samples. When submitted to these outside specialists, [employer] will notify you that your tissue sample result has returned. However, this only means that the tissue sample has been sent to the specialist. These results are not available in [employer], so I will contact you when I receive the final report.    PATIENT INFORMATION : WOUNDS  During the healing process you will notice a number of changes. All wounds develop a small halo of redness surrounding the wound.  This means healing is occurring. Severe itching with extensive redness usually indicates sensitivity to the ointment or bandage tape used to dress the wound.  You should call our office if this develops.      Swelling  and/or discoloration around your surgical site is common, particularly when performed around the eye.    All wounds normally drain.  The larger the wound the more drainage there will be.  After 7-10 days, you will notice the wound beginning to shrink and new skin will begin to grow.  The wound is healed when you can see skin has formed over the entire area.  A healed wound  has a healthy, shiny look to the surface and is red to dark pink in color to normalize.  Wounds may take approximately 4-6 weeks to heal.  Larger wounds may take 6-8 weeks. After the wound is healed you may discontinue dressing changes.    You may experience a sensation of tightness as your wound heals. This is normal and will gradually subside.    Your healed wound may be sensitive to temperature changes. This sensitivity improves with time, but if you re having a lot of discomfort, try to avoid temperature extremes.    Patients frequently experience itching after their wound appears to have healed because of the continue healing under the skin.  Plain Vaseline will help relieve the itching.          Follow-ups after your visit        Who to contact     If you have questions or need follow up information about today's clinic visit or your schedule please contact [facility] - [facility] directly at [phone].  Normal or non-critical lab and imaging results will be communicated to you by [email or web address], letter or phone within 4 business days after the clinic has received the results. If you do not hear from us within 7 days, please contact the clinic through [employer] or phone. If you have a critical or abnormal lab result, we will notify you by phone as soon as possible.  Submit refill requests through [employer] or call your pharmacy and they will forward the refill request to us. Please allow 3 business days for your refill to be completed.          Additional Information About Your Visit        MyChart Information     [employer] gives you secure access to your electronic health record. If you see a primary care provider, you can also send messages to your care team and make appointments. If you have questions, please call your primary care clinic.  If you do not have a primary care provider, please call [phone] and they will assist you.        Care EveryWhere ID     This is your Care EveryWhere ID. This  could be used by other organizations to access your [place] medical records  [id number]         Blood Pressure from Last 3 Encounters:   03/22/17 110/70   03/06/17 124/82   08/29/05 100/60    Weight from Last 3 Encounters:   03/22/17 171 lb (77.6 kg)   03/06/17 167 lb (75.8 kg)              We Performed the Following     Antinuclear antibody screen by EIA     BIOPSY SKIN/SUBQ/MUC MEM, EACH ADDTL LESION     BIOPSY SKIN/SUBQ/MUC MEM, SINGLE LESION     CBC with platelets differential     CL SURG PATH [facility] DERM     Comprehensive metabolic panel     CRP inflammation     Direct Immunofluorescence Pathology: Laboratory Miscellaneous Order     Erythrocyte sedimentation rate auto        Primary Care Provider    None Specified       No primary provider on file.        Thank you!     Thank you for choosing [facility] - [facility]  for your care. Our goal is always to provide you with excellent care. Hearing back from our patients is one way we can continue to improve our services. Please take a few minutes to complete the written survey that you may receive in the mail after your visit with us. Thank you!             Your Updated Medication List - Protect others around you: Learn how to safely use, store and throw away your medicines at www.disposemymeds.org.      Notice  As of 4/11/2017 10:11 AM    You have not been prescribed any medications.

## 2017-04-11 NOTE — PROGRESS NOTES
"[facility] - PRIMARY CARE SKIN    CC : Rash   SUBJECTIVE:                                                    Vega Valentino is a 37 year old male who presents to clinic today for follow-up of a(n) rash beginning 4-6 weeks ago in various spots on the left knee, left wrist and right chest. He continues to notice development of \"cold sore\" bumps on the arms, legs, and back. While he denies any blistering, he does endorse itchiness and swelling of the area surrounding bumps. Itchiness is most noticeable in the surrounding areas as opposed to the localized bumps. The affected areas have never fully resolved since onset 4 weeks ago.    He has noticed fatigue and an increased sense of warmth whenever lesions develop. Fatigue is waxing and waning. He denies fever.    Current treatment : prednisone 40 mg for 7 days beginning 3/28/17  Response to treatment : no improvement of existing lesions, but no new lesions developed until after finishing the course of prednisone.  Side effects noted : increased energy has been a positive side effect for the patient.    For complete history, please review office visit notes from 3/28/2017  Recent exposure history : He recalls a flu 3 weeks ago that preceded rash symptoms by 1 week. Sore throat and fatigue have resolved.  Any other family members with similar symptoms : NO. Two roommates have not had rash symptoms. No pets at home. Lives in an older home, but he denies any similarity to previous bed bug issue five years ago. No recent travels.  Previous therapies tried : Rx mupirocin began to alleviate itchiness and raised nature of rash areas within 1 day usage. Rx hydrocortisone also prescribed in the last 1 week which has not alleviated itchiness. Rx fluconazole prescribed in the last 3 weeks as well.    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    Patient Active Problem List   Diagnosis     Tobacco use disorder     Tinea versicolor     Welt       History reviewed. No pertinent past medical history. History reviewed. No pertinent surgical history.   Social History   Substance Use Topics     Smoking status: Current Every Day Smoker     Packs/day: 0.50     Types: Cigarettes     Smokeless tobacco: Not on file     Alcohol use Yes      Comment: minimal, once a weekend         Prescription Medications as of 4/11/2017             predniSONE (DELTASONE) 20 MG tablet 2 tabs po q day times 3 days , 1.5 tabs po q day times 3 days, 1 tab po q day times 3 days 1/2 tab q day times 3 days            Allergies   Allergen Reactions     Iodine      Seafood      Shrimp         CONSTITUTIONAL: POSITIVE for fatigue, warmth, NEGATIVE for fever  INTEGUMENTARY/SKIN: POSITIVE for lumps and bumps, pruritis and rash  MS: NEGATIVE for arthralgias, myalgias    ROS : 14 point review of systems was negative except the symptoms listed above in the HPI.    This document serves as a record of the services and decisions personally performed and made by Ximena Harvey MD. It was created on her behalf by Master Spivey, a trained medical scribe.  The creation of this document is based on the scribe's personal observations and the provider's statements to the medical scribe.  Master Spivey, April 11, 2017 10:02 AM      OBJECTIVE:                                                    GENERAL: healthy, alert and in mild distress  SKIN: Underwood Skin Type - I.  Scalp, Face, Neck, Trunk, Arms, Legs, Hands and Fingers were examined. The dermatoscope was used to help evaluate pigmented lesions.  Skin Pertinent Findings:  Left forearm : 10 mm x 2 mm, linear, indurated raised lesion.    Right arm : 1 cm, indurated, erythematous patch.   Right medial elbow : Similar 1 cm  erythematous, indurated patch. Tenderness on palpation.    Back : 2 cm and 1 cm indurated erythematous patch on the midline and on the left lower back. Resolving patch on the midline.    Left upper arm, left forehead : Residual erythema at site of excoriations.    Left thigh : Three resolving erythematous papules in a linear distribution.    Right upper arm - erythematous, tender, warm , indurated  3 cmx 2 cm lesion - associated with similar lesions on forearms, back -  ? Erythema nodosum ? Viral exanthem ? Arthropod assault reaction ? Other         Diagnostic Test Results:  none     MDM : based on the clinical appearance and history I suspect this is a viral exanthem, if this would continue then I would consider biopsy.      ASSESSMENT:                                                      Encounter Diagnosis   Name Primary?     Dermatitis Yes         PLAN:                                                    Patient Instructions     FUTURE APPOINTMENTS  Follow up in 7-10 day(s) for Suture Removal and Tissue Pathology Review, with Dr. Harvey.    PREDNISONE 40 mg tablet TAPER INSTRUCTIONS (oral steroid):  Take by mouth at the same time every day and with food to prevent stomach upset. If it helps to remember, record these instructions in a frequently visible place.    Scheduled Day # Dosage   1-3 40 mg = 2 tablets   4-6 30 mg = 1.5 tablets   7-9 20 mg = 1 tablet   10-12 10 mg = 0.5 tablets       WOUND CARE INSTRUCTIONS  1. After 24 hours, change dressing daily.  2. Wash hands before every dressing change.  3. Wash the wound area with a mild soap, then rinse  4. Gently pat dry with a sterile gauze or Q-tip.  5. Apply Vaseline or Aquaphor only over entire wound. Do NOT use Neosporin - as many people react to neomycin.  6. Finally, cover with a bandage or sterile non-stick gauze with micropore paper tape.  7. Repeat once daily until wound has healed.    Do not let the wound dry out.  The wound will heal faster and with  "better cosmetic results if routinely kept moist with step #5. It is a false belief that a wound heals better when it is exposed to air and allowed to dry out.      Soap, water and shampoo will not hurt this area.    Do not go swimming or take baths, but showering is encouraged.    Limit use of the area where the procedure was done for a few days to allow for optimal healing.    If you experience bleeding:  Repeat steps #2-5 and hold firm pressure on the area for 10 minutes without checking to see if the bleeding has stopped. \"Checking\" pulls off the protective wound clot and restarts the bleeding all over again. Re-apply pressure for 10 minutes if necessary to stop bleeding.  Use additional sterile gauze and tape to maintain pressure once bleeding has stopped.    Signs of Infection:  Infection can occur in any area where skin has been disrupted.  If you notice persistent redness, swelling, colored drainage, increasing pain, fever or other signs of infection, please call us at: [phone] and ask to have me or my colleague paged. We will call you back to discuss.    Pathology Results:  You will be notified, generally via letter or MyChart, in approximately 10 days. If there is anything we need to discuss or further treatment needed, I will call you to discuss it.    Skin tissue can be some of the most challenging tissue for pathologists to examine, therefore we may use a specialist outside the [facility] system to read certain submitted tissue samples. When submitted to these outside specialists, [employer] will notify you that your tissue sample result has returned. However, this only means that the tissue sample has been sent to the specialist. These results are not available in [facility], so I will contact you when I receive the final report.    PATIENT INFORMATION : WOUNDS  During the healing process you will notice a number of changes. All wounds develop a small halo of redness surrounding the wound.  This means " healing is occurring. Severe itching with extensive redness usually indicates sensitivity to the ointment or bandage tape used to dress the wound.  You should call our office if this develops.      Swelling  and/or discoloration around your surgical site is common, particularly when performed around the eye.    All wounds normally drain.  The larger the wound the more drainage there will be.  After 7-10 days, you will notice the wound beginning to shrink and new skin will begin to grow.  The wound is healed when you can see skin has formed over the entire area.  A healed wound has a healthy, shiny look to the surface and is red to dark pink in color to normalize.  Wounds may take approximately 4-6 weeks to heal.  Larger wounds may take 6-8 weeks. After the wound is healed you may discontinue dressing changes.    You may experience a sensation of tightness as your wound heals. This is normal and will gradually subside.    Your healed wound may be sensitive to temperature changes. This sensitivity improves with time, but if you re having a lot of discomfort, try to avoid temperature extremes.    Patients frequently experience itching after their wound appears to have healed because of the continue healing under the skin.  Plain Vaseline will help relieve the itching.      The patient was counseled to use products free of fragrance, dyes, and plants. The importance of using bland cleansers and the regular use of heavy bland emollient creams was impressed upon the patient.      PROCEDURES:                                                    Name : Punch Biopsy  Indication : Biopsy for pathology to evaluate tissue.  Location(s) : right upper arm.  Completed by : Ximena Harvey MD  Photo Taken : no.  Anesthesia : Patient was anesthetized by infiltrating the area surrounding the lesion with 1% lidocaine.   epinephrine [id number] : Yes.  Buffered with bicarbonate : Yes.  Note : Discussed the risk of pain, infection, scarring,  hypo- or hyperpigmentation and recurrence or need for re-treatment. The benefits of treatment and alternative treatments were also discussed.    During this procedure, the universal protocol was utilized. The patient's identity was confirmed by no less than two patient identifiers, correct procedure was verified, correct site was verified and marked as applicable and a final pause was completed.    Sterile technique was used throughout the procedure. The skin was cleaned and prepped with surgical cleanser. Once adequate anesthesia was obtained, the lesion was biopsied using a 4mm punch and appropriate skin traction. The specimen was sent to pathology.    Direct pressure and was applied for hemostasis. The skin was closed with simple interrupted sutures of 4-0 Prolene. No bleeding was present upon the completion of the procedure. The wound was coated with antibacterial ointment. A dry sterile dressing was applied.    Name : Direct Immunofluorescence Pathology (DIF)  Note : A punch biopsy was performed with the technique above. A perilesional biopsy was performed using a 4 millimeter punch with appropriate skin traction. Direct pressure was applied for hemostasis. The specimen was sent to pathology. Both areas were incorporated into single defect.  Total number of stitches in closure of epidermis : 3    Primary provider and referring provider will be informed regarding the wound culture and tissue sample report when it returns.    Suture removal : 7-10 days.      The information in this document, created by the medical scribe for me, accurately reflects the services I personally performed and the decisions made by me. I have reviewed and approved this document for accuracy prior to leaving the patient care area.  Ximena Harvey MD April 11, 2017 9:56 AM  [facility] - PRIMARY CARE SKIN

## 2017-04-12 LAB — ANA SER QL IA: NORMAL

## 2017-04-18 ENCOUNTER — OFFICE VISIT (OUTPATIENT)
Dept: FAMILY MEDICINE | Facility: CLINIC | Age: 37
End: 2017-04-18
Payer: COMMERCIAL
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
## 2017-04-18 PROCEDURE — 99213 OFFICE O/P EST LOW 20 MIN: CPT | Performed by: FAMILY MEDICINE

## 2017-04-18 NOTE — PATIENT INSTRUCTIONS
FUTURE APPOINTMENTS  Follow up as needed.    Consider professional pest evaluation and/or treatment of your home. Examine both your mattress and the edges of your bed frame for potential bed bug locations.

## 2017-04-18 NOTE — MR AVS SNAPSHOT
After Visit Summary   4/18/2017    Vega Valentino    MRN: [id number]           Patient Information     Date Of Birth          1980        Visit Information        Provider Department      4/18/2017 12:20 PM Jennifer Harvey MD [facility] Primary Care Skin        Care Instructions    FUTURE APPOINTMENTS  Follow up as needed.    Consider professional pest evaluation and/or treatment of your home. Examine both your mattress and the edges of your bed frame for potential bed bug locations.        Follow-ups after your visit        Who to contact     If you have questions or need follow up information about today's clinic visit or your schedule please contact [facility] PRIMARY CARE SKIN directly at [phone].  Normal or non-critical lab and imaging results will be communicated to you by MyChart, letter or phone within 4 business days after the clinic has received the results. If you do not hear from us within 7 days, please contact the clinic through [facility] or phone. If you have a critical or abnormal lab result, we will notify you by phone as soon as possible.  Submit refill requests through [employer] or call your pharmacy and they will forward the refill request to us. Please allow 3 business days for your refill to be completed.          Additional Information About Your Visit        MyChart Information     [employer] gives you secure access to your electronic health record. If you see a primary care provider, you can also send messages to your care team and make appointments. If you have questions, please call your primary care clinic.  If you do not have a primary care provider, please call [phone] and they will assist you.        Care EveryWhere ID     This is your Care EveryWhere ID. This could be used by other organizations to access your [facility] medical records  [id number]         Blood Pressure from Last 3 Encounters:   03/22/17 110/70   03/06/17 124/82    08/29/05 100/60    Weight from Last 3 Encounters:   03/22/17 171 lb (77.6 kg)   03/06/17 167 lb (75.8 kg)              Today, you had the following     No orders found for display       Primary Care Provider    None Specified       No primary provider on file.        Thank you!     Thank you for choosing [facility] - PRIMARY CARE [facility]  for your care. Our goal is always to provide you with excellent care. Hearing back from our patients is one way we can continue to improve our services. Please take a few minutes to complete the written survey that you may receive in the mail after your visit with us. Thank you!             Your Updated Medication List - Protect others around you: Learn how to safely use, store and throw away your medicines at www.disposemymeds.org.          This list is accurate as of: 4/18/17 12:40 PM.  Always use your most recent med list.                   Brand Name Dispense Instructions for use    predniSONE 20 MG tablet    DELTASONE    15 tablet    2 tabs po q day times 3 days , 1.5 tabs po q day times 3 days, 1 tab po q day times 3 days 1/2 tab q day times 3 days

## 2017-04-18 NOTE — PROGRESS NOTES
[facility] - PRIMARY CARE SKIN    CC : Rash   SUBJECTIVE:                                                    Vega Valentino is a 37 year old male who presents to clinic today for follow-up of a(n) rash beginning 1-2 months ago in various spots on the left knee, left wrist and right chest. He has not filled the most recent prescription of prednisone. Symptoms have begun to resolve without development of new lesions.    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    Patient Active Problem List   Diagnosis     Tobacco use disorder     Tinea versicolor     Welt       No past medical history on file. No past surgical history on file.   Social History   Substance Use Topics     Smoking status: Current Every Day Smoker     Packs/day: 0.50     Types: Cigarettes     Smokeless tobacco: Not on file     Alcohol use Yes      Comment: minimal, once a weekend         Prescription Medications as of 4/18/2017             predniSONE (DELTASONE) 20 MG tablet 2 tabs po q day times 3 days , 1.5 tabs po q day times 3 days, 1 tab po q day times 3 days 1/2 tab q day times 3 days            Allergies   Allergen Reactions     Iodine      Seafood      Shrimp         INTEGUMENTARY/SKIN: NEGATIVE for worrisome rash, moles or lesions  ROS : 14 point review of systems was negative except the symptoms listed above in the HPI.    This document serves as a record of the services and decisions personally performed and made by Ximena Harvey MD. It was created on her behalf by Master Spivey, a trained medical scribe.  The creation of this document is based on the scribe's  personal observations and the provider's statements to the medical scribe.  Master Spivey, April 11, 2017 10:02 AM      OBJECTIVE:                                                    GENERAL: healthy, alert and in mild distress  SKIN: Underwood Skin Type - I.  Trunk were examined. The dermatoscope was used to help evaluate pigmented lesions.  Skin Pertinent Findings:  Right upper arm : healing biopsy site.   No new sites,  Areas of previous involvement just residual post inflammatory erythema/    Diagnostic Test Results:  none     MDM : discussed results of the tissue report in the context of the clinical presentation and suspect that arthropod bite is the most likely cause in particular discussed bedbugs.      ASSESSMENT:                                                      Encounter Diagnosis   Name Primary?     Arthropod bite of multiple sites Yes         PLAN:                                                    Patient Instructions   FUTURE APPOINTMENTS  Follow up as needed.    Consider professional pest evaluation and/or treatment of your home. Examine both your mattress and the edges of your bed frame for potential bed bug locations.        PROCEDURES:                                                    None.    TT: 20 minutes.  CT: 15 minutes.      The information in this document, created by the medical scribe for me, accurately reflects the services I personally performed and the decisions made by me. I have reviewed and approved this document for accuracy prior to leaving the patient care area.  Ximena Harvey MD April 18, 2017 12:33 PM  [facility] - PRIMARY CARE SKIN

## 2017-05-01 LAB
LOCATION PERFORMED: NORMAL
NORMAL RANGE FOR SEND OUTS MISC TEST: NORMAL
RESULT: NORMAL
SEND OUTS MISC TEST CODE: NORMAL
SEND OUTS MISC TEST SPECIMEN: NORMAL
TEST NAME: NORMAL

## 2019-11-07 ENCOUNTER — HEALTH MAINTENANCE LETTER (OUTPATIENT)
Age: 39
End: 2019-11-07

## 2020-11-29 ENCOUNTER — HEALTH MAINTENANCE LETTER (OUTPATIENT)
Age: 40
End: 2020-11-29

## 2021-09-25 ENCOUNTER — HEALTH MAINTENANCE LETTER (OUTPATIENT)
Age: 41
End: 2021-09-25

## 2022-01-15 ENCOUNTER — HEALTH MAINTENANCE LETTER (OUTPATIENT)
Age: 42
End: 2022-01-15

## 2022-12-26 ENCOUNTER — HEALTH MAINTENANCE LETTER (OUTPATIENT)
Age: 42
End: 2022-12-26

## 2023-04-16 ENCOUNTER — HEALTH MAINTENANCE LETTER (OUTPATIENT)
Age: 43
End: 2023-04-16